# Patient Record
Sex: FEMALE | Race: WHITE | NOT HISPANIC OR LATINO | Employment: STUDENT | URBAN - METROPOLITAN AREA
[De-identification: names, ages, dates, MRNs, and addresses within clinical notes are randomized per-mention and may not be internally consistent; named-entity substitution may affect disease eponyms.]

---
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
## 2022-07-13 ENCOUNTER — OFFICE VISIT (OUTPATIENT)
Dept: URGENT CARE | Facility: URGENT CARE | Age: 22
End: 2022-07-13
Payer: COMMERCIAL

## 2022-07-13 ENCOUNTER — E-VISIT (OUTPATIENT)
Dept: URGENT CARE | Facility: URGENT CARE | Age: 22
End: 2022-07-13
Payer: COMMERCIAL

## 2022-07-13 VITALS
OXYGEN SATURATION: 99 % | BODY MASS INDEX: 23.18 KG/M2 | WEIGHT: 148 LBS | TEMPERATURE: 97.4 F | DIASTOLIC BLOOD PRESSURE: 89 MMHG | SYSTOLIC BLOOD PRESSURE: 124 MMHG | HEART RATE: 73 BPM

## 2022-07-13 DIAGNOSIS — N30.01 ACUTE CYSTITIS WITH HEMATURIA: Primary | ICD-10-CM

## 2022-07-13 DIAGNOSIS — R30.0 DYSURIA: ICD-10-CM

## 2022-07-13 DIAGNOSIS — R30.0 DIFFICULT OR PAINFUL URINATION: Primary | ICD-10-CM

## 2022-07-13 LAB
ALBUMIN UR-MCNC: 100 MG/DL
APPEARANCE UR: ABNORMAL
BACTERIA #/AREA URNS HPF: ABNORMAL /HPF
BILIRUB UR QL STRIP: NEGATIVE
CLUE CELLS: ABNORMAL
COLOR UR AUTO: YELLOW
GLUCOSE UR STRIP-MCNC: NEGATIVE MG/DL
HGB UR QL STRIP: ABNORMAL
KETONES UR STRIP-MCNC: NEGATIVE MG/DL
LEUKOCYTE ESTERASE UR QL STRIP: NEGATIVE
NITRATE UR QL: NEGATIVE
PH UR STRIP: 6 [PH] (ref 5–7)
RBC #/AREA URNS AUTO: >100 /HPF
SP GR UR STRIP: >=1.03 (ref 1–1.03)
SQUAMOUS #/AREA URNS AUTO: ABNORMAL /LPF
TRICHOMONAS, WET PREP: ABNORMAL
UROBILINOGEN UR STRIP-ACNC: 0.2 E.U./DL
WBC #/AREA URNS AUTO: ABNORMAL /HPF
WBC'S/HIGH POWER FIELD, WET PREP: ABNORMAL
YEAST, WET PREP: ABNORMAL

## 2022-07-13 PROCEDURE — 87210 SMEAR WET MOUNT SALINE/INK: CPT | Performed by: NURSE PRACTITIONER

## 2022-07-13 PROCEDURE — 87086 URINE CULTURE/COLONY COUNT: CPT | Performed by: NURSE PRACTITIONER

## 2022-07-13 PROCEDURE — 87186 SC STD MICRODIL/AGAR DIL: CPT | Performed by: NURSE PRACTITIONER

## 2022-07-13 PROCEDURE — 99207 PR NO CHARGE LOS: CPT | Performed by: PHYSICIAN ASSISTANT

## 2022-07-13 PROCEDURE — 81001 URINALYSIS AUTO W/SCOPE: CPT | Performed by: NURSE PRACTITIONER

## 2022-07-13 PROCEDURE — 99214 OFFICE O/P EST MOD 30 MIN: CPT | Performed by: NURSE PRACTITIONER

## 2022-07-13 RX ORDER — NITROFURANTOIN 25; 75 MG/1; MG/1
100 CAPSULE ORAL 2 TIMES DAILY
Qty: 10 CAPSULE | Refills: 0 | Status: SHIPPED | OUTPATIENT
Start: 2022-07-13 | End: 2022-07-18

## 2022-07-13 NOTE — PATIENT INSTRUCTIONS
Dear Vanessa Bush,    We are sorry you are not feeling well. Based on the responses you provided, it is recommended that you be seen in-person in urgent care so we can better evaluate your symptoms. It does sound consistent with a urinary tract infection, but since you've never had one before I would recommend that we confirm it with lab testing.  Please click here to find the nearest urgent care location to you.   You will not be charged for this Visit. Thank you for trusting us with your care.    Sharon Ford PA-C

## 2022-07-13 NOTE — PROGRESS NOTES
Chief Complaint   Patient presents with     UTI     SX URGENCY, PAIN WHILE URINATING, 36 HRS, NO ITCHING, DISCHARGE OR BURNING         ICD-10-CM    1. Acute cystitis with hematuria  N30.01 nitroFURantoin macrocrystal-monohydrate (MACROBID) 100 MG capsule   2. Dysuria  R30.0 UA Macro with Reflex to Micro and Culture - lab collect     Wet prep - lab collect     Wet prep - lab collect     UA Macro with Reflex to Micro and Culture - lab collect     Urine Microscopic     Urine Culture Aerobic Bacterial - lab collect   Antibiotics as prescribed, increase fluid intake, reviewed ways of preventing urinary tract infections.  Advised patient to go to the emergency room if she develops fever, chills, back pain or nausea and vomiting.    32 minutes spent on the date of the encounter doing chart review, history and exam, documentation and further activities per the note        Results for orders placed or performed in visit on 07/13/22 (from the past 24 hour(s))   Wet prep - lab collect    Specimen: Vagina; Swab   Result Value Ref Range    Trichomonas Absent Absent    Yeast Absent Absent    Clue Cells Absent Absent    WBCs/high power field 2+ (A) None   UA Macro with Reflex to Micro and Culture - lab collect    Specimen: Urine, Midstream   Result Value Ref Range    Color Urine Yellow Colorless, Straw, Light Yellow, Yellow    Appearance Urine Slightly Cloudy (A) Clear    Glucose Urine Negative Negative mg/dL    Bilirubin Urine Negative Negative    Ketones Urine Negative Negative mg/dL    Specific Gravity Urine >=1.030 1.003 - 1.035    Blood Urine Large (A) Negative    pH Urine 6.0 5.0 - 7.0    Protein Albumin Urine 100  (A) Negative mg/dL    Urobilinogen Urine 0.2 0.2, 1.0 E.U./dL    Nitrite Urine Negative Negative    Leukocyte Esterase Urine Negative Negative   Urine Microscopic   Result Value Ref Range    Bacteria Urine Few (A) None Seen /HPF    RBC Urine >100 (A) 0-2 /HPF /HPF    WBC Urine 5-10 (A) 0-5 /HPF /HPF    Squamous  Epithelials Urine Few (A) None Seen /LPF    Narrative    Urine Culture not indicated       Subjective     Vanessa Bush is an 22 year old female who presents to clinic today for dysuria for several days.  She also has urinary frequency and urgency.    ROS: 10 point ROS neg other than the symptoms noted above in the HPI.       Objective    /89 (BP Location: Left arm, Patient Position: Sitting, Cuff Size: Adult Regular)   Pulse 73   Temp 97.4  F (36.3  C) (Axillary)   Wt 67.1 kg (148 lb)   SpO2 99%   BMI 23.18 kg/m    Nurses notes and VS have been reviewed.    Physical Exam       GENERAL APPEARANCE: healthy appearing, alert     ABDOMEN:  soft, nontender, no HSM or masses and bowel sounds normal, no CVA tenderness     MS: extremities normal- no gross deformities noted; normal muscle tone.     SKIN: no suspicious lesions or rashes     PSYCH: normal thought process; no significant mood disturbance    Patient Instructions   PREVENTION OF URINARY TRACT INFECTION    AVOID CHEMICAL IRRITTANTS: bath gels,  Perfumed products,  Deoderant pads or tampons, douching    CLOTHING that increases moisture and bacterial growth:  Nylon, lycra, pantihose, pantiliners     AVOID: tight clothing and thongs    ACIDIFY URINE: cranberry tablets instead of cranberry juice (with excess sugar) to acidify urine and decrease bacterial growth.     URINATE after intercourse    FLUIDS: 6-8 glasses water per day       RESHMA Thomas, CNP  [facility] Provider    The use of Dragon/[employer] dictation services may have been used to construct the content in this note; any grammatical or spelling errors are non-intentional. Please contact the author of this note directly if you are in need of any clarification.

## 2022-07-13 NOTE — PATIENT INSTRUCTIONS
PREVENTION OF URINARY TRACT INFECTION    AVOID CHEMICAL IRRITTANTS: bath gels,  Perfumed products,  Deoderant pads or tampons, douching    CLOTHING that increases moisture and bacterial growth:  Nylon, lycra, pantihose, pantiliners     AVOID: tight clothing and thongs    ACIDIFY URINE: cranberry tablets instead of cranberry juice (with excess sugar) to acidify urine and decrease bacterial growth.     URINATE after intercourse    FLUIDS: 6-8 glasses water per day

## 2022-07-16 LAB — BACTERIA UR CULT: ABNORMAL

## 2022-07-21 ENCOUNTER — OFFICE VISIT (OUTPATIENT)
Dept: FAMILY MEDICINE | Facility: CLINIC | Age: 22
End: 2022-07-21
Payer: COMMERCIAL

## 2022-07-21 VITALS
OXYGEN SATURATION: 100 % | TEMPERATURE: 97.5 F | SYSTOLIC BLOOD PRESSURE: 117 MMHG | RESPIRATION RATE: 17 BRPM | WEIGHT: 148.8 LBS | HEART RATE: 72 BPM | DIASTOLIC BLOOD PRESSURE: 74 MMHG | HEIGHT: 67 IN | BODY MASS INDEX: 23.35 KG/M2

## 2022-07-21 DIAGNOSIS — Z11.1 SCREENING EXAMINATION FOR PULMONARY TUBERCULOSIS: ICD-10-CM

## 2022-07-21 DIAGNOSIS — Z11.3 SCREENING FOR STDS (SEXUALLY TRANSMITTED DISEASES): ICD-10-CM

## 2022-07-21 DIAGNOSIS — Z30.41 ENCOUNTER FOR SURVEILLANCE OF CONTRACEPTIVE PILLS: Primary | ICD-10-CM

## 2022-07-21 PROCEDURE — 86481 TB AG RESPONSE T-CELL SUSP: CPT | Performed by: NURSE PRACTITIONER

## 2022-07-21 PROCEDURE — 87591 N.GONORRHOEAE DNA AMP PROB: CPT | Performed by: NURSE PRACTITIONER

## 2022-07-21 PROCEDURE — 36415 COLL VENOUS BLD VENIPUNCTURE: CPT | Performed by: NURSE PRACTITIONER

## 2022-07-21 PROCEDURE — 87491 CHLMYD TRACH DNA AMP PROBE: CPT | Performed by: NURSE PRACTITIONER

## 2022-07-21 PROCEDURE — 99213 OFFICE O/P EST LOW 20 MIN: CPT | Performed by: NURSE PRACTITIONER

## 2022-07-21 RX ORDER — NORGESTIMATE AND ETHINYL ESTRADIOL 0.25-0.035
1 KIT ORAL DAILY
Qty: 84 TABLET | Refills: 4 | Status: SHIPPED | OUTPATIENT
Start: 2022-07-21 | End: 2023-08-14

## 2022-07-21 ASSESSMENT — PAIN SCALES - GENERAL: PAINLEVEL: NO PAIN (0)

## 2022-07-21 NOTE — PROGRESS NOTES
Assessment & Plan     Encounter for surveillance of contraceptive pills  No contraindications. Refilled.  - norgestimate-ethinyl estradiol (ORTHO-CYCLEN) 0.25-35 MG-MCG tablet; Take 1 tablet by mouth daily    Screening for STDs (sexually transmitted diseases)  Asymptomatic.  - NEISSERIA GONORRHOEA PCR; Future  - CHLAMYDIA TRACHOMATIS PCR; Future    Screening examination for pulmonary tuberculosis  No symptoms.  - Quantiferon TB Gold Plus; Future       See Patient Instructions    Return in about 1 year (around 7/21/2023).     Return precautions discussed, including when to seek urgent/emergent care.    Patient verbalizes understanding and agrees with plan of care. Patient stable for discharge.      RESHMA ROSAS [facility]    Rekha Mendez is a 22 year old, presenting for the following health issues:  Refill Request and TB gold (Patient needs tb gold for work )      History of Present Illness       Reason for visit:  Perscription refill and TB skin test    She eats 4 or more servings of fruits and vegetables daily.She consumes 1 sweetened beverage(s) daily.She exercises with enough effort to increase her heart rate 60 or more minutes per day.  She exercises with enough effort to increase her heart rate 6 days per week.   She is taking medications regularly.         The patient is not more than 35 years old. She denies smoking, diabetes, HTN, DVT, headaches with focal neurological symptoms, migraines, known thrombogenic mutations, heart disease, history of stroke, valvular disease, abnormal uterine bleeding, liver disease and personal or family history of breast cancer.    Needs TB testing. No known exposure. No cough, hemoptysis, night sweats or weight loss.    Review of Systems   Constitutional, HEENT, cardiovascular, pulmonary, gi and gu systems are negative, except as otherwise noted.      Objective    /74 (BP Location: Left arm, Patient Position: Sitting,  "Cuff Size: Adult Regular)   Pulse 72   Temp 97.5  F (36.4  C) (Tympanic)   Resp 17   Ht 1.695 m (5' 6.75\")   Wt 67.5 kg (148 lb 12.8 oz)   SpO2 100%   BMI 23.48 kg/m    Body mass index is 23.48 kg/m .  Physical Exam   GENERAL: healthy, alert and no distress  RESP: lungs clear to auscultation - no rales, rhonchi or wheezes  CV: regular rate and rhythm, normal S1 S2, no S3 or S4, no murmur, click or rub, no peripheral edema and peripheral pulses strong  MS: no gross musculoskeletal defects noted, no edema  PSYCH: mentation appears normal, affect normal/bright                    .  ..  "

## 2022-07-22 LAB
C TRACH DNA SPEC QL NAA+PROBE: NEGATIVE
N GONORRHOEA DNA SPEC QL NAA+PROBE: NEGATIVE

## 2022-07-23 LAB
GAMMA INTERFERON BACKGROUND BLD IA-ACNC: 0 IU/ML
M TB IFN-G BLD-IMP: NEGATIVE
M TB IFN-G CD4+ BCKGRND COR BLD-ACNC: 10 IU/ML
MITOGEN IGNF BCKGRD COR BLD-ACNC: 0.01 IU/ML
MITOGEN IGNF BCKGRD COR BLD-ACNC: 0.01 IU/ML
QUANTIFERON MITOGEN: 10 IU/ML
QUANTIFERON NIL TUBE: 0 IU/ML
QUANTIFERON TB1 TUBE: 0.01 IU/ML
QUANTIFERON TB2 TUBE: 0.01

## 2022-12-13 ENCOUNTER — TELEPHONE (OUTPATIENT)
Dept: PEDIATRICS | Facility: CLINIC | Age: 22
End: 2022-12-13

## 2022-12-13 NOTE — TELEPHONE ENCOUNTER
Called Vanessa and she will call her primary clinic to reschedule. SURESH GREENE on 12/13/2022 at 12:39 PM

## 2022-12-13 NOTE — TELEPHONE ENCOUNTER
Please call patient and get on a family medicine schedule. I do not see new patient over 20.Janeth REDDY MD, MD 12/13/2022 12:05 PM

## 2022-12-16 ENCOUNTER — TRANSFERRED RECORDS (OUTPATIENT)
Dept: HEALTH INFORMATION MANAGEMENT | Facility: CLINIC | Age: 22
End: 2022-12-16

## 2022-12-19 ENCOUNTER — OFFICE VISIT (OUTPATIENT)
Dept: FAMILY MEDICINE | Facility: CLINIC | Age: 22
End: 2022-12-19
Payer: COMMERCIAL

## 2022-12-19 VITALS
WEIGHT: 141 LBS | SYSTOLIC BLOOD PRESSURE: 100 MMHG | HEART RATE: 75 BPM | TEMPERATURE: 98.2 F | DIASTOLIC BLOOD PRESSURE: 68 MMHG | OXYGEN SATURATION: 99 % | HEIGHT: 67 IN | RESPIRATION RATE: 14 BRPM | BODY MASS INDEX: 22.13 KG/M2

## 2022-12-19 DIAGNOSIS — Z01.818 PREOP GENERAL PHYSICAL EXAM: Primary | ICD-10-CM

## 2022-12-19 DIAGNOSIS — S83.512D RUPTURE OF ANTERIOR CRUCIATE LIGAMENT OF LEFT KNEE, SUBSEQUENT ENCOUNTER: ICD-10-CM

## 2022-12-19 LAB — HCG UR QL: NEGATIVE

## 2022-12-19 PROCEDURE — 81025 URINE PREGNANCY TEST: CPT | Performed by: PHYSICIAN ASSISTANT

## 2022-12-19 PROCEDURE — 99214 OFFICE O/P EST MOD 30 MIN: CPT | Performed by: PHYSICIAN ASSISTANT

## 2022-12-19 ASSESSMENT — PAIN SCALES - GENERAL: PAINLEVEL: NO PAIN (0)

## 2022-12-19 NOTE — PROGRESS NOTES
[facility]  [address]  [place] [address]  Phone: [phone]  Primary Provider: Savana Finney  Pre-op Performing Provider: ROBIN SHAVER    PREOPERATIVE EVALUATION:  Today's date: 12/19/2022    Vanessa Bush is a 22 year old female who presents for a preoperative evaluation.    Surgical Information:  Surgery/Procedure: ACL, left knee  Surgery Location: [facility] with [facility]  Surgeon: TORO Giraldo  Surgery Date: 12/20/22  Time of Surgery: 11:45 am  Where patient plans to recover: At home with family  Fax number for surgical facility: [phone]    Type of Anesthesia Anticipated: General    Assessment & Plan     The proposed surgical procedure is considered INTERMEDIATE risk.    Problem List Items Addressed This Visit    None  Visit Diagnoses     Preop general physical exam    -  Primary    Rupture of anterior cruciate ligament of left knee, subsequent encounter            Risks and Recommendations:  The patient has the following additional risks and recommendations for perioperative complications:   - No identified additional risk factors other than previously addressed    Medication Instructions:  Patient is to take all scheduled medications on the day of surgery    RECOMMENDATION:  APPROVAL GIVEN to proceed with proposed procedure pending review of diagnostic evaluation. I will contact her if HCG returns positive, otherwise, she may proceed with procedure.    Ordering of each unique test    Need physical copy for patient    Subjective     HPI related to upcoming procedure: left knee arthroscopy for ACL repair. LMP 2 weeks ago.       Preop Questions 12/17/2022   1. Have you ever had a heart attack or stroke? No   2. Have you ever had surgery on your heart or blood vessels, such as a stent placement, a coronary artery bypass, or surgery on an artery in your head, neck, heart, or legs? No   3. Do you have chest pain with activity? No   4. Do you have a  history of  heart failure? No   5. Do you currently have a cold, bronchitis or symptoms of other infection? No   6. Do you have a cough, shortness of breath, or wheezing? No   7. Do you or anyone in your family have previous history of blood clots? No   8. Do you or does anyone in your family have a serious bleeding problem such as prolonged bleeding following surgeries or cuts? No   9. Have you ever had problems with anemia or been told to take iron pills? No   10. Have you had any abnormal blood loss such as black, tarry or bloody stools, or abnormal vaginal bleeding? No   11. Have you ever had a blood transfusion? No   12. Are you willing to have a blood transfusion if it is medically needed before, during, or after your surgery? Yes   13. Have you or any of your relatives ever had problems with anesthesia? No   14. Do you have sleep apnea, excessive snoring or daytime drowsiness? No   15. Do you have any artifical heart valves or other implanted medical devices like a pacemaker, defibrillator, or continuous glucose monitor? No   16. Do you have artificial joints? No   17. Are you allergic to latex? No   18. Is there any chance that you may be pregnant? No     Health Care Directive:  Patient has a Health Care Directive on file      Preoperative Review of :   reviewed - no record of controlled substances prescribed.    Status of Chronic Conditions:  See problem list for active medical problems.  Problems all longstanding and stable, except as noted/documented.  See ROS for pertinent symptoms related to these conditions.      Review of Systems  CONSTITUTIONAL: NEGATIVE for fever, chills, change in weight  INTEGUMENTARY/SKIN: NEGATIVE for worrisome rashes, moles or lesions  EYES: NEGATIVE for vision changes or irritation  ENT/MOUTH: NEGATIVE for ear, mouth and throat problems  RESP: NEGATIVE for significant cough or SOB  CV: NEGATIVE for chest pain, palpitations or peripheral edema  GI: NEGATIVE for nausea,  "abdominal pain, heartburn, or change in bowel habits  : NEGATIVE for frequency, dysuria, or hematuria  MUSCULOSKELETAL: NEGATIVE for significant arthralgias or myalgia  NEURO: NEGATIVE for weakness, dizziness or paresthesias  ENDOCRINE: NEGATIVE for temperature intolerance, skin/hair changes  HEME: NEGATIVE for bleeding problems  PSYCHIATRIC: NEGATIVE for changes in mood or affect    Patient Active Problem List    Diagnosis Date Noted     Cutaneous skin tags 06/10/2015     Priority: Medium      No past medical history on file.  No past surgical history on file.  Current Outpatient Medications   Medication Sig Dispense Refill     norgestimate-ethinyl estradiol (ORTHO-CYCLEN) 0.25-35 MG-MCG tablet Take 1 tablet by mouth daily 84 tablet 4       No Known Allergies     Social History     Tobacco Use     Smoking status: Never     Smokeless tobacco: Never   Substance Use Topics     Alcohol use: No     Family History   Problem Relation Age of Onset     Breast Cancer Mother      History   Drug Use No         Objective     /68   Pulse 75   Temp 98.2  F (36.8  C) (Tympanic)   Resp 14   Ht 1.708 m (5' 7.24\")   Wt 64 kg (141 lb)   LMP 12/05/2022 (Approximate)   SpO2 99%   Breastfeeding No   BMI 21.92 kg/m      Physical Exam    GENERAL APPEARANCE: healthy, alert and no distress     HENT: nose and mouth without ulcers or lesions     NECK: no adenopathy, no asymmetry, masses, or scars and thyroid normal to palpation     RESP: lungs clear to auscultation - no rales, rhonchi or wheezes     CV: regular rates and rhythm, normal S1 S2, no S3 or S4 and no murmur, click or rub     ABDOMEN:  soft, nontender, no HSM or masses and bowel sounds normal     MS: extremities normal- no gross deformities noted, no evidence of inflammation in joints, FROM in all extremities.     SKIN: no suspicious lesions or rashes     NEURO: Normal strength and tone, sensory exam grossly normal, mentation intact and speech normal     PSYCH: " mentation appears normal. and affect normal/bright     LYMPHATICS: No cervical adenopathy    Diagnostics:  Labs pending at this time.  Results will be reviewed when available.   No EKG required, no history of coronary heart disease, significant arrhythmia, peripheral arterial disease or other structural heart disease.    Revised Cardiac Risk Index (RCRI):  The patient has the following serious cardiovascular risks for perioperative complications:   - No serious cardiac risks = 0 points     RCRI Interpretation: 0 points: Class I (very low risk - 0.4% complication rate)    Signed Electronically by: RENETTA Kiran  Copy of this evaluation report is provided to requesting physician.

## 2022-12-19 NOTE — PATIENT INSTRUCTIONS
Lewis Mendez,    Thank you for allowing [facility] to manage your care.    I ordered some lab work, please go to the laboratory to get your studies.    If you have any questions or concerns, please feel free to call us at [phone]    Sincerely,    Fady Andres PA-C    Did you know?      You can schedule a video visit for follow-up appointments as well as future appointments for certain conditions.  Please see the below link.     [email or web address]    If you have not already done so,  I encourage you to sign up for [employer] ([email or web address]).  This will allow you to review your results, securely communicate with a provider, and schedule virtual visits as well.    For informational purposes only. Not to replace the advice of your health care provider. Copyright   2003, 2019 [facility]. All rights reserved. Clinically reviewed by Irish Dugan MD. tocario [id number] - REV 12/22.  Preparing for Your Surgery  Getting started  A nurse will call you to review your health history and instructions. They will give you an arrival time based on your scheduled surgery time. Please be ready to share:  Your doctor's clinic name and phone number  Your medical, surgical, and anesthesia history  A list of allergies and sensitivities  A list of medicines, including herbal treatments and over-the-counter drugs  Whether the patient has a legal guardian (ask how to send us the papers in advance)  Please tell us if you're pregnant--or if there's any chance you might be pregnant. Some surgeries may injure a fetus (unborn baby), so they require a pregnancy test. Surgeries that are safe for a fetus don't always need a test, and you can choose whether to have one.   If you have a child who's having surgery, please ask for a copy of Preparing for Your Child's Surgery.    Preparing for surgery  Within 10 to 30 days of surgery: Have a pre-op exam (sometimes called  an H&P, or History and Physical). This can be done at a clinic or pre-operative center.  If you're having a , you may not need this exam. Talk to your care team.  At your pre-op exam, talk to your care team about all medicines you take. If you need to stop any medicines before surgery, ask when to start taking them again.  We do this for your safety. Many medicines can make you bleed too much during surgery. Some change how well surgery (anesthesia) drugs work.  Call your insurance company to let them know you're having surgery. (If you don't have insurance, call [phone].)  Call your clinic if there's any change in your health. This includes signs of a cold or flu (sore throat, runny nose, cough, rash, fever). It also includes a scrape or scratch near the surgery site.  If you have questions on the day of surgery, call your hospital or surgery center.  Eating and drinking guidelines  For your safety: Unless your surgeon tells you otherwise, follow the guidelines below.  Eat and drink as usual until 8 hours before you arrive for surgery. After that, no food or milk.  Drink clear liquids until 2 hours before you arrive. These are liquids you can see through, like water, Gatorade, and Propel Water. They also include plain black coffee and tea (no cream or milk), candy, and breath mints. You can spit out gum when you arrive.  If you drink alcohol: Stop drinking it the night before surgery.  If your care team tells you to take medicine on the morning of surgery, it's okay to take it with a sip of water.  Preventing infection  Shower or bathe the night before and morning of your surgery. Follow the instructions your clinic gave you. (If no instructions, use regular soap.)  Don't shave or clip hair near your surgery site. We'll remove the hair if needed.  Don't smoke or vape the morning of surgery. You may chew nicotine gum up to 2 hours before surgery. A nicotine patch is okay.  Note: Some surgeries require  you to completely quit smoking and nicotine. Check with your surgeon.  Your care team will make every effort to keep you safe from infection. We will:  Clean our hands often with soap and water (or an alcohol-based hand rub).  Clean the skin at your surgery site with a special soap that kills germs.  Give you a special gown to keep you warm. (Cold raises the risk of infection.)  Wear special hair covers, masks, gowns and gloves during surgery.  Give antibiotic medicine, if prescribed. Not all surgeries need antibiotics.  What to bring on the day of surgery  Photo ID and insurance card  Copy of your health care directive, if you have one  Glasses and hearing aids (bring cases)  You can't wear contacts during surgery  Inhaler and eye drops, if you use them (tell us about these when you arrive)  CPAP machine or breathing device, if you use them  A few personal items, if spending the night  If you have . . .  A pacemaker, ICD (cardiac defibrillator) or other implant: Bring the ID card.  An implanted stimulator: Bring the remote control.  A legal guardian: Bring a copy of the certified (court-stamped) guardianship papers.  Please remove any jewelry, including body piercings. Leave jewelry and other valuables at home.  If you're going home the day of surgery  You must have a responsible adult drive you home. They should stay with you overnight as well.  If you don't have someone to stay with you, and you aren't safe to go home alone, we may keep you overnight. Insurance often won't pay for this.  After surgery  If it's hard to control your pain or you need more pain medicine, please call your surgeon's office.  Questions?   If you have any questions for your care team, list them here: _________________________________________________________________________________________________________________________________________________________________________ ____________________________________  ____________________________________ ____________________________________

## 2023-01-03 ENCOUNTER — TRANSFERRED RECORDS (OUTPATIENT)
Dept: HEALTH INFORMATION MANAGEMENT | Facility: CLINIC | Age: 23
End: 2023-01-03

## 2023-01-31 ENCOUNTER — TRANSFERRED RECORDS (OUTPATIENT)
Dept: HEALTH INFORMATION MANAGEMENT | Facility: CLINIC | Age: 23
End: 2023-01-31

## 2023-03-09 ENCOUNTER — TRANSFERRED RECORDS (OUTPATIENT)
Dept: HEALTH INFORMATION MANAGEMENT | Facility: CLINIC | Age: 23
End: 2023-03-09

## 2023-04-23 ENCOUNTER — HEALTH MAINTENANCE LETTER (OUTPATIENT)
Age: 23
End: 2023-04-23

## 2023-05-31 ENCOUNTER — OFFICE VISIT (OUTPATIENT)
Dept: URGENT CARE | Facility: URGENT CARE | Age: 23
End: 2023-05-31
Payer: COMMERCIAL

## 2023-05-31 VITALS
BODY MASS INDEX: 22.71 KG/M2 | OXYGEN SATURATION: 99 % | RESPIRATION RATE: 16 BRPM | WEIGHT: 144.7 LBS | DIASTOLIC BLOOD PRESSURE: 87 MMHG | SYSTOLIC BLOOD PRESSURE: 134 MMHG | HEART RATE: 83 BPM | TEMPERATURE: 98.1 F | HEIGHT: 67 IN

## 2023-05-31 DIAGNOSIS — R07.0 THROAT PAIN: Primary | ICD-10-CM

## 2023-05-31 LAB
DEPRECATED S PYO AG THROAT QL EIA: NEGATIVE
GROUP A STREP BY PCR: NOT DETECTED

## 2023-05-31 PROCEDURE — 99213 OFFICE O/P EST LOW 20 MIN: CPT | Performed by: PHYSICIAN ASSISTANT

## 2023-05-31 PROCEDURE — 87651 STREP A DNA AMP PROBE: CPT | Performed by: PHYSICIAN ASSISTANT

## 2023-05-31 ASSESSMENT — PAIN SCALES - GENERAL: PAINLEVEL: NO PAIN (1)

## 2023-05-31 NOTE — PROGRESS NOTES
"Chief Complaint   Patient presents with     Pharyngitis     Throat pain beginning yesterday morning.       ASSESSMENT/PLAN:  Vanessa was seen today for pharyngitis.    Diagnoses and all orders for this visit:    Throat pain  -     Streptococcus A Rapid Screen w/Reflex to PCR - Clinic Collect  -     Group A Streptococcus PCR Throat Swab    Appears well overall, reassuring exam and vitals    Strep negative.  Likely viral    Jose Kirkpatrick PA-C      SUBJECTIVE:  Vanessa is a 23 year old female who presents to urgent care with 1 day of sore throat.  Feels otherwise well.    ROS: Pertinent ROS neg other than the symptoms noted above in the HPI.     OBJECTIVE:  /87 (BP Location: Left arm, Patient Position: Sitting, Cuff Size: Adult Regular)   Pulse 83   Temp 98.1  F (36.7  C) (Tympanic)   Resp 16   Ht 1.702 m (5' 7\")   Wt 65.6 kg (144 lb 11.2 oz)   SpO2 99%   BMI 22.66 kg/m     GENERAL: healthy, alert and no distress  EYES: Eyes grossly normal to inspection, PERRL and conjunctivae and sclerae normal  HENT: ear canals and TM's normal, nose and mouth without ulcers or lesions, left tonsil has 1 spot of exudate, minimal erythema  NECK: no adenopathy, nontender  RESP: lungs clear to auscultation - no rales, rhonchi or wheezes  CV: regular rate and rhythm, normal S1 S2, no S3 or S4, no murmur, click or rub    DIAGNOSTICS    Results for orders placed or performed in visit on 05/31/23   Streptococcus A Rapid Screen w/Reflex to PCR - Clinic Collect     Status: Normal    Specimen: Throat; Swab   Result Value Ref Range    Group A Strep antigen Negative Negative        Current Outpatient Medications   Medication     norgestimate-ethinyl estradiol (ORTHO-CYCLEN) 0.25-35 MG-MCG tablet     No current facility-administered medications for this visit.      Patient Active Problem List   Diagnosis     Cutaneous skin tags      No past medical history on file.  No past surgical history on file.  Family History   Problem " Relation Age of Onset     Breast Cancer Mother      Social History     Tobacco Use     Smoking status: Never     Passive exposure: Never     Smokeless tobacco: Never   Vaping Use     Vaping status: Never Used   Substance Use Topics     Alcohol use: No              The plan of care was discussed with the patient. They understand and agree with the course of treatment prescribed. A printed summary was given including instructions and medications.  The use of Dragon/[employer] dictation services may have been used to construct the content in this note; any grammatical or spelling errors are non-intentional. Please contact the author of this note directly if you are in need of any clarification.

## 2023-06-22 ENCOUNTER — TRANSFERRED RECORDS (OUTPATIENT)
Dept: HEALTH INFORMATION MANAGEMENT | Facility: CLINIC | Age: 23
End: 2023-06-22
Payer: COMMERCIAL

## 2023-08-14 ENCOUNTER — VIRTUAL VISIT (OUTPATIENT)
Dept: FAMILY MEDICINE | Facility: CLINIC | Age: 23
End: 2023-08-14
Payer: COMMERCIAL

## 2023-08-14 DIAGNOSIS — L70.0 ACNE VULGARIS: ICD-10-CM

## 2023-08-14 DIAGNOSIS — Z30.41 ENCOUNTER FOR SURVEILLANCE OF CONTRACEPTIVE PILLS: Primary | ICD-10-CM

## 2023-08-14 PROCEDURE — 99213 OFFICE O/P EST LOW 20 MIN: CPT | Mod: VID | Performed by: NURSE PRACTITIONER

## 2023-08-14 RX ORDER — TRETINOIN 0.25 MG/G
CREAM TOPICAL AT BEDTIME
Qty: 45 G | Refills: 3 | Status: SHIPPED | OUTPATIENT
Start: 2023-08-14 | End: 2024-06-06

## 2023-08-14 RX ORDER — NORGESTIMATE AND ETHINYL ESTRADIOL 0.25-0.035
1 KIT ORAL DAILY
Qty: 84 TABLET | Refills: 4 | Status: SHIPPED | OUTPATIENT
Start: 2023-08-14 | End: 2024-06-06

## 2023-08-14 NOTE — PROGRESS NOTES
Vanessa is a 23 year old who is being evaluated via a billable video visit.      How would you like to obtain your AVS? MyChart  If the video visit is dropped, the invitation should be resent by: Text to cell phone: [phone]  Will anyone else be joining your video visit? No        Assessment & Plan     Encounter for surveillance of contraceptive pills  Taking consistently, tolerating well.   Declines STD testing today.   - norgestimate-ethinyl estradiol (ORTHO-CYCLEN) 0.25-35 MG-MCG tablet; Take 1 tablet by mouth daily    Acne vulgaris  Skin care reviewed.   Has used tretinoin in past with good effect.   Rx sent today, discussed administration and monitoring.     - tretinoin (RETIN-A) 0.025 % external cream; Apply topically At Bedtime    RESHMA Briones [facility]    Rekha Mendez is a 23 year old, presenting for the following health issues:  Contraception        8/14/2023    10:03 AM   Additional Questions   Roomed by Alejandrina DE LA CRUZ     Medication Followup of ORTHO-CYCLEN  Taking Medication as prescribed: yes  Side Effects:  None  Medication Helping Symptoms:  yes    Patient requests NANY refill. Taking consistently, no frequent missed doses. Denies spotting, dysmenorrhea, or other concerns.  Last dose taken yesterday, if takes today no break in dosing.   +sexual activity. Declines STI screening.     2. Acne  Reports acne to face, intermittent, typically at same time of menstrual cycle monthly.  Using mild cleanser, toner, moisturizer. No medicated agents at present.   Has used sister's tretinoin in past with good effect, tolerates well with no skin burning/dryness.       Review of Systems   Constitutional, HEENT, cardiovascular, pulmonary, GI, , musculoskeletal, neuro, skin, endocrine and psych systems are negative, except as otherwise noted.      Objective           Vitals:  No vitals were obtained today due to virtual visit.    Physical Exam   GENERAL:  Healthy, alert and no distress  EYES: Eyes grossly normal to inspection.  No discharge or erythema, or obvious scleral/conjunctival abnormalities.  RESP: No audible wheeze, cough, or visible cyanosis.  No visible retractions or increased work of breathing.    SKIN: few isolated papular lesions to face, chin.  Non-pustular, non-inflammatory acne.   NEURO: Cranial nerves grossly intact.  Mentation and speech appropriate for age.  PSYCH: Mentation appears normal, affect normal/bright, judgement and insight intact, normal speech and appearance well-groomed.              Video-Visit Details    Type of service:  Video Visit     Originating Location (pt. Location): Home    Distant Location (provider location):  On-site  Platform used for Video Visit: [employer]

## 2023-08-21 ENCOUNTER — TRANSFERRED RECORDS (OUTPATIENT)
Dept: HEALTH INFORMATION MANAGEMENT | Facility: CLINIC | Age: 23
End: 2023-08-21
Payer: COMMERCIAL

## 2023-09-28 ENCOUNTER — TRANSFERRED RECORDS (OUTPATIENT)
Dept: HEALTH INFORMATION MANAGEMENT | Facility: CLINIC | Age: 23
End: 2023-09-28
Payer: COMMERCIAL

## 2023-12-18 ENCOUNTER — TRANSFERRED RECORDS (OUTPATIENT)
Dept: HEALTH INFORMATION MANAGEMENT | Facility: CLINIC | Age: 23
End: 2023-12-18
Payer: COMMERCIAL

## 2024-06-02 SDOH — HEALTH STABILITY: PHYSICAL HEALTH: ON AVERAGE, HOW MANY MINUTES DO YOU ENGAGE IN EXERCISE AT THIS LEVEL?: 60 MIN

## 2024-06-02 SDOH — HEALTH STABILITY: PHYSICAL HEALTH: ON AVERAGE, HOW MANY DAYS PER WEEK DO YOU ENGAGE IN MODERATE TO STRENUOUS EXERCISE (LIKE A BRISK WALK)?: 6 DAYS

## 2024-06-02 ASSESSMENT — SOCIAL DETERMINANTS OF HEALTH (SDOH): HOW OFTEN DO YOU GET TOGETHER WITH FRIENDS OR RELATIVES?: MORE THAN THREE TIMES A WEEK

## 2024-06-06 ENCOUNTER — OFFICE VISIT (OUTPATIENT)
Dept: FAMILY MEDICINE | Facility: CLINIC | Age: 24
End: 2024-06-06
Payer: COMMERCIAL

## 2024-06-06 VITALS
BODY MASS INDEX: 23.45 KG/M2 | OXYGEN SATURATION: 100 % | DIASTOLIC BLOOD PRESSURE: 85 MMHG | SYSTOLIC BLOOD PRESSURE: 126 MMHG | WEIGHT: 149.4 LBS | HEIGHT: 67 IN | HEART RATE: 79 BPM | RESPIRATION RATE: 16 BRPM | TEMPERATURE: 97.7 F

## 2024-06-06 DIAGNOSIS — Z12.4 CERVICAL CANCER SCREENING: ICD-10-CM

## 2024-06-06 DIAGNOSIS — Z00.00 ROUTINE GENERAL MEDICAL EXAMINATION AT A HEALTH CARE FACILITY: Primary | ICD-10-CM

## 2024-06-06 DIAGNOSIS — Z30.41 ENCOUNTER FOR SURVEILLANCE OF CONTRACEPTIVE PILLS: ICD-10-CM

## 2024-06-06 PROBLEM — S83.519A COMPLETE TEAR, KNEE, ANTERIOR CRUCIATE LIGAMENT: Status: RESOLVED | Noted: 2022-12-11 | Resolved: 2024-06-06

## 2024-06-06 PROCEDURE — 90471 IMMUNIZATION ADMIN: CPT

## 2024-06-06 PROCEDURE — 87491 CHLMYD TRACH DNA AMP PROBE: CPT

## 2024-06-06 PROCEDURE — 99395 PREV VISIT EST AGE 18-39: CPT | Mod: 25

## 2024-06-06 PROCEDURE — G0145 SCR C/V CYTO,THINLAYER,RESCR: HCPCS

## 2024-06-06 PROCEDURE — 90715 TDAP VACCINE 7 YRS/> IM: CPT

## 2024-06-06 PROCEDURE — 87591 N.GONORRHOEAE DNA AMP PROB: CPT

## 2024-06-06 RX ORDER — NORGESTIMATE AND ETHINYL ESTRADIOL 0.25-0.035
1 KIT ORAL DAILY
Qty: 84 TABLET | Refills: 4 | Status: SHIPPED | OUTPATIENT
Start: 2024-06-06

## 2024-06-06 ASSESSMENT — PAIN SCALES - GENERAL: PAINLEVEL: NO PAIN (0)

## 2024-06-06 NOTE — PATIENT INSTRUCTIONS
"Preventive Care Advice   This is general advice we often give to help people stay healthy. Your care team may have specific advice just for you. Please talk to your care team about your own preventive care needs.  Lifestyle  Exercise at least 150 minutes each week (30 minutes a day, 5 days a week).  Do muscle strengthening activities 2 days a week. These help control your weight and prevent disease.  No smoking.  Wear sunscreen to prevent skin cancer.  Have your home tested for radon every 2 to 5 years. Radon is a colorless, odorless gas that can harm your lungs. To learn more, go to www.health.[facility].mn. and search for \"Radon in Homes.\"  Keep guns unloaded and locked up in a safe place like a safe or gun vault, or, use a gun lock and hide the keys. Always lock away bullets separately. To learn more, visit [employer].mn.gov and search for \"safe gun storage.\"  Nutrition  Eat 5 or more servings of fruits and vegetables each day.  Try wheat bread, brown rice and whole grain pasta (instead of white bread, rice, and pasta).  Get enough calcium and vitamin D. Check the label on foods and aim for 100% of the RDA (recommended daily allowance).  Regular exams  Have a dental exam and cleaning every 6 months.  See your health care team every year to talk about:  Any changes in your health.  Any medicines your care team has prescribed.  Preventive care, family planning, and ways to prevent chronic diseases.  Shots (vaccines)   HPV shots (up to age 26), if you've never had them before.  Hepatitis B shots (up to age 59), if you've never had them before.  COVID-19 shot: Get this shot when it's due.  Flu shot: Get a flu shot every year.  Tetanus shot: Get a tetanus shot every 10 years.  Pneumococcal, hepatitis A, and RSV shots: Ask your care team if you need these based on your risk.  Shingles shot (for age 50 and up).  General health tests  Diabetes screening:  Starting at age 35, Get screened for diabetes at least every 3 years.  If " you are younger than age 35, ask your care team if you should be screened for diabetes.  Cholesterol test: At age 39, start having a cholesterol test every 5 years, or more often if advised.  Bone density scan (DEXA): At age 50, ask your care team if you should have this scan for osteoporosis (brittle bones).  Hepatitis C: Get tested at least once in your life.  Abdominal aortic aneurysm screening: Talk to your doctor about having this screening if you:  Have ever smoked; and  Are biologically male; and  Are between the ages of 65 and 75.  STIs (sexually transmitted infections)  Before age 24: Ask your care team if you should be screened for STIs.  After age 24: Get screened for STIs if you're at risk. You are at risk for STIs (including HIV) if:  You are sexually active with more than one person.  You don't use condoms every time.  You or a partner was diagnosed with a sexually transmitted infection.  If you are at risk for HIV, ask about PrEP medicine to prevent HIV.  Get tested for HIV at least once in your life, whether you are at risk for HIV or not.  Cancer screening tests  Cervical cancer screening: If you have a cervix, begin getting regular cervical cancer screening tests at age 21. Most people who have regular screenings with normal results can stop after age 65. Talk about this with your provider.  Breast cancer scan (mammogram): If you've ever had breasts, begin having regular mammograms starting at age 40. This is a scan to check for breast cancer.  Colon cancer screening: It is important to start screening for colon cancer at age 45.  Have a colonoscopy test every 10 years (or more often if you're at risk) Or, ask your provider about stool tests like a FIT test every year or Cologuard test every 3 years.  To learn more about your testing options, visit: www.[employer]/453445.pdf.  For help making a decision, visit: victor hugo/sn18607.  Prostate cancer screening test: If you have a prostate and are age 55  to 69, ask your provider if you would benefit from a yearly prostate cancer screening test.  Lung cancer screening: If you are a current or former smoker age 50 to 80, ask your care team if ongoing lung cancer screenings are right for you.  For informational purposes only. Not to replace the advice of your health care provider. Copyright   2023 [employer]. All rights reserved. Clinically reviewed by the [facility]. [employer] 923485 - REV 04/24.

## 2024-06-06 NOTE — PROGRESS NOTES
Preventive Care Visit  [facility] VENKAT RESHMA Wray CNP, Family Medicine  Jun 6, 2024      Assessment & Plan     Routine general medical examination at a health care facility  - Health maintenance and lifestyle reviewed. Updated medical and family history.  - Follow up in one year or sooner as needed.   - TDAP 10-64Y (ADACEL,BOOSTRIX)  - PRIMARY CARE FOLLOW-UP SCHEDULING  - Chlamydia & Gonorrhea by PCR, G[facility]/Range - Clinic Collect    Cervical cancer screening  - Discussed screening recommendations.  - Pap Screen Only - Recommended Age 21 - 24 Years    Encounter for surveillance of contraceptive pills  - Tolerating well. No side effects or contraindications. Refilled.   - norgestimate-ethinyl estradiol (ORTHO-CYCLEN) 0.25-35 MG-MCG tablet  Dispense: 84 tablet; Refill: 4    Counseling  Appropriate preventive services were discussed with this patient, including applicable screening as appropriate for fall prevention, nutrition, physical activity, Tobacco-use cessation, weight loss and cognition.  Checklist reviewing preventive services available has been given to the patient.  Reviewed patient's diet, addressing concerns and/or questions.     Return for physical in one year or sooner as needed. Cleared for sports participation.     Rekha Mendez is a 24 year old, presenting for the following:  Physical and Contraception        6/6/2024     4:05 PM   Additional Questions   Roomed by Jane         6/6/2024     4:05 PM   Patient Reported Additional Medications   Patient reports taking the following new medications none      Health Care Directive  Patient has a Health Care Directive on file  Advance care planning document is on file and is current.        6/2/2024   General Health   How would you rate your overall physical health? Excellent   Feel stress (tense, anxious, or unable to sleep) Not at all         6/2/2024   Nutrition   Three or more servings of calcium each day? Yes    Diet: Regular (no restrictions)   How many servings of fruit and vegetables per day? 4 or more   How many sweetened beverages each day? 0-1         6/2/2024   Exercise   Days per week of moderate/strenous exercise 6 days   Average minutes spent exercising at this level 60 min         6/2/2024   Social Factors   Frequency of gathering with friends or relatives More than three times a week   Worry food won't last until get money to buy more No   Food not last or not have enough money for food? No   Do you have housing?  Yes   Are you worried about losing your housing? No   Lack of transportation? No   Unable to get utilities (heat,electricity)? No         6/2/2024   Dental   Dentist two times every year? Yes     Today's PHQ-2 Score:       6/6/2024     4:04 PM   PHQ-2 ( 1999 [employer])   Q1: Little interest or pleasure in doing things 0   Q2: Feeling down, depressed or hopeless 0   PHQ-2 Score 0   Q1: Little interest or pleasure in doing things Not at all   Q2: Feeling down, depressed or hopeless Not at all   PHQ-2 Score 0         6/2/2024   Substance Use   Alcohol more than 3/day or more than 7/wk No   Do you use any other substances recreationally? No     Social History     Tobacco Use    Smoking status: Never     Passive exposure: Never    Smokeless tobacco: Never   Vaping Use    Vaping status: Never Used   Substance Use Topics    Alcohol use: No    Drug use: No         6/2/2024   STI Screening   New sexual partner(s) since last STI/HIV test? No     History of abnormal Pap smear: No - under age 21, PAP not appropriate for age         6/2/2024   Contraception/Family Planning   Questions about contraception or family planning No     Reviewed and updated as needed this visit by Provider   Tobacco   Meds  Problems    Fam Hx  Soc Hx Sexual Activity        Review of Systems  Constitutional, HEENT, cardiovascular, pulmonary, gi and gu systems are negative, except as otherwise noted.     Objective      Exam  /85  "(BP Location: Left arm, Patient Position: Sitting, Cuff Size: Adult Regular)   Pulse 79   Temp 97.7  F (36.5  C) (Oral)   Resp 16   Ht 1.701 m (5' 6.97\")   Wt 67.8 kg (149 lb 6.4 oz)   SpO2 100%   BMI 23.42 kg/m     Estimated body mass index is 23.42 kg/m  as calculated from the following:    Height as of this encounter: 1.701 m (5' 6.97\").    Weight as of this encounter: 67.8 kg (149 lb 6.4 oz).    Physical Exam  GENERAL: alert and no distress  EYES: Eyes grossly normal to inspection, PERRL and conjunctivae and sclerae normal  HENT: ear canals and TM's normal, nose and mouth without ulcers or lesions  NECK: no adenopathy, no asymmetry, masses, or scars  RESP: lungs clear to auscultation - no rales, rhonchi or wheezes  CV: regular rate and rhythm, normal S1 S2, no S3 or S4, no murmur, click or rub, no peripheral edema  ABDOMEN: soft, nontender, no hepatosplenomegaly, no masses and bowel sounds normal  MS: no gross musculoskeletal defects noted, no edema  SKIN: no suspicious lesions or rashes  NEURO: Normal strength and tone, mentation intact and speech normal  PSYCH: mentation appears normal, affect normal/bright    Signed Electronically by: RESHMA Cheema CNP    "

## 2024-06-07 LAB
C TRACH DNA SPEC QL PROBE+SIG AMP: NEGATIVE
N GONORRHOEA DNA SPEC QL NAA+PROBE: NEGATIVE

## 2024-06-11 LAB
BKR LAB AP GYN ADEQUACY: NORMAL
BKR LAB AP GYN INTERPRETATION: NORMAL
BKR LAB AP HPV REFLEX: NO
BKR LAB AP PREVIOUS ABNORMAL: NORMAL
PATH REPORT.COMMENTS IMP SPEC: NORMAL
PATH REPORT.COMMENTS IMP SPEC: NORMAL
PATH REPORT.RELEVANT HX SPEC: NORMAL

## 2024-08-04 ENCOUNTER — OFFICE VISIT (OUTPATIENT)
Dept: URGENT CARE | Facility: URGENT CARE | Age: 24
End: 2024-08-04
Payer: COMMERCIAL

## 2024-08-04 VITALS
WEIGHT: 148.2 LBS | OXYGEN SATURATION: 100 % | RESPIRATION RATE: 18 BRPM | BODY MASS INDEX: 23.23 KG/M2 | HEART RATE: 83 BPM | DIASTOLIC BLOOD PRESSURE: 86 MMHG | TEMPERATURE: 97.9 F | SYSTOLIC BLOOD PRESSURE: 123 MMHG

## 2024-08-04 DIAGNOSIS — L03.90 CELLULITIS OF SKIN: Primary | ICD-10-CM

## 2024-08-04 PROCEDURE — 99213 OFFICE O/P EST LOW 20 MIN: CPT | Performed by: FAMILY MEDICINE

## 2024-08-04 RX ORDER — CEPHALEXIN 500 MG/1
500 CAPSULE ORAL 3 TIMES DAILY
Qty: 30 CAPSULE | Refills: 0 | Status: SHIPPED | OUTPATIENT
Start: 2024-08-04 | End: 2024-08-14

## 2024-08-04 NOTE — PROGRESS NOTES
Assessment & Plan     (L03.90) Cellulitis of skin  (primary encounter diagnosis)  Comment:   Plan: cephALEXin (KEFLEX) 500 MG capsule          Started has a small blister at the back of her left heel, 3 days ago.  Now the whole area has been red and warm to touch.    Patient has no fever no chills.  She does have a small blister which been opened and leaked clear fluid.  Advised with supportive care, keep elevated, started cephalexin take 3 times a day for the next 10 days.       Judith Campuzano MD  [facility]     Vanessa is a 24 year old female who presents to clinic today for the following health issues:  Chief Complaint   Patient presents with    Blister     Blister to left heel started on Thursday. Dirt and lake water in it, concerned about possible infection. Redness and warm to the touch, itchy.      Patient reports she had a small blister after she was wearing a cowboy boots.  She has more redness, more swollen.  No fever no chills      Review of Systems  Constitutional, HEENT, cardiovascular, pulmonary, gi and gu systems are negative, except as otherwise noted.      Objective    /86 (BP Location: Left arm, Cuff Size: Adult Regular)   Pulse 83   Temp 97.9  F (36.6  C) (Tympanic)   Resp 18   Wt 67.2 kg (148 lb 3.2 oz)   SpO2 100%   BMI 23.23 kg/m    Physical Exam   GENERAL: alert and no distress  SKIN: left heel, achilles area:  Small blister, been opening the leak.  She does have an area of redness, warmth to touch.  The area is about 5 cm x 8 cm in diameter.  No induration and no abscess formation.  PSYCH: mentation appears normal, affect normal/bright

## 2025-02-06 ENCOUNTER — TELEPHONE (OUTPATIENT)
Dept: FAMILY MEDICINE | Facility: OTHER | Age: 25
End: 2025-02-06
Payer: COMMERCIAL

## 2025-02-06 NOTE — TELEPHONE ENCOUNTER
Spoke to Laisha today. Informed her that she is not due for her physical until 6/6/25. She states that [place] is requiring that she have physical signed off. I advised her to contact insurance to see if this would be covered and let her know that we could change visit to a office visit to establish care for new provider. She will get back to us if appointment needs to be changed.

## 2025-02-18 SDOH — HEALTH STABILITY: PHYSICAL HEALTH: ON AVERAGE, HOW MANY DAYS PER WEEK DO YOU ENGAGE IN MODERATE TO STRENUOUS EXERCISE (LIKE A BRISK WALK)?: 6 DAYS

## 2025-02-18 SDOH — HEALTH STABILITY: PHYSICAL HEALTH: ON AVERAGE, HOW MANY MINUTES DO YOU ENGAGE IN EXERCISE AT THIS LEVEL?: 120 MIN

## 2025-02-18 ASSESSMENT — SOCIAL DETERMINANTS OF HEALTH (SDOH): HOW OFTEN DO YOU GET TOGETHER WITH FRIENDS OR RELATIVES?: MORE THAN THREE TIMES A WEEK

## 2025-02-19 ENCOUNTER — OFFICE VISIT (OUTPATIENT)
Dept: FAMILY MEDICINE | Facility: OTHER | Age: 25
End: 2025-02-19
Payer: COMMERCIAL

## 2025-02-19 VITALS
HEART RATE: 69 BPM | BODY MASS INDEX: 21.77 KG/M2 | WEIGHT: 147 LBS | OXYGEN SATURATION: 98 % | SYSTOLIC BLOOD PRESSURE: 107 MMHG | RESPIRATION RATE: 19 BRPM | HEIGHT: 69 IN | TEMPERATURE: 97.7 F | DIASTOLIC BLOOD PRESSURE: 73 MMHG

## 2025-02-19 DIAGNOSIS — Z30.41 ENCOUNTER FOR SURVEILLANCE OF CONTRACEPTIVE PILLS: Primary | ICD-10-CM

## 2025-02-19 DIAGNOSIS — Z11.9 SCREENING EXAMINATION FOR INFECTIOUS DISEASE: ICD-10-CM

## 2025-02-19 DIAGNOSIS — Z11.4 SCREENING FOR HIV (HUMAN IMMUNODEFICIENCY VIRUS): ICD-10-CM

## 2025-02-19 DIAGNOSIS — Z11.1 SCREENING EXAMINATION FOR PULMONARY TUBERCULOSIS: ICD-10-CM

## 2025-02-19 DIAGNOSIS — Z11.59 NEED FOR HEPATITIS C SCREENING TEST: ICD-10-CM

## 2025-02-19 LAB
HBV SURFACE AB SERPL IA-ACNC: 10.9 M[IU]/ML
HBV SURFACE AB SERPL IA-ACNC: NORMAL M[IU]/ML
HCV AB SERPL QL IA: NONREACTIVE
HIV 1+2 AB+HIV1 P24 AG SERPL QL IA: NONREACTIVE

## 2025-02-19 PROCEDURE — 86803 HEPATITIS C AB TEST: CPT | Performed by: FAMILY MEDICINE

## 2025-02-19 PROCEDURE — 99213 OFFICE O/P EST LOW 20 MIN: CPT | Performed by: FAMILY MEDICINE

## 2025-02-19 PROCEDURE — 86706 HEP B SURFACE ANTIBODY: CPT | Performed by: FAMILY MEDICINE

## 2025-02-19 PROCEDURE — G2211 COMPLEX E/M VISIT ADD ON: HCPCS | Performed by: FAMILY MEDICINE

## 2025-02-19 PROCEDURE — 36415 COLL VENOUS BLD VENIPUNCTURE: CPT | Performed by: FAMILY MEDICINE

## 2025-02-19 PROCEDURE — 87389 HIV-1 AG W/HIV-1&-2 AB AG IA: CPT | Performed by: FAMILY MEDICINE

## 2025-02-19 RX ORDER — NORGESTIMATE AND ETHINYL ESTRADIOL 0.25-0.035
1 KIT ORAL DAILY
Qty: 84 TABLET | Refills: 3 | Status: SHIPPED | OUTPATIENT
Start: 2025-02-19

## 2025-02-19 ASSESSMENT — PAIN SCALES - GENERAL: PAINLEVEL_OUTOF10: NO PAIN (0)

## 2025-02-19 NOTE — PATIENT INSTRUCTIONS
Good luck with school!    Let us know if any problems.    We will let you know your results as soon as we can.  If you have MyChart, you will be able to see your lab and imaging results shortly after they become available.  I will review these and let you know my interpretation, but this usually takes additional time.  If you have multiple labs, I usually wait until they are all back before sending a note about them unless something is worrisome.  If you do not have MyChart, we will let you know your lab results as soon as we can.  If they are normal, this can take up to a week.     Contact us or return if questions or concerns.    Have a nice day!    Dr. Belle

## 2025-02-19 NOTE — PROGRESS NOTES
Assessment & Plan       ICD-10-CM    1. Encounter for surveillance of contraceptive pills  Z30.41 norgestimate-ethinyl estradiol (ORTHO-CYCLEN) 0.25-35 MG-MCG tablet      2. Screening examination for infectious disease  Z11.9 Hepatitis B Surface Antibody     Hepatitis B Surface Antibody      3. Screening for HIV (human immunodeficiency virus)  Z11.4 HIV Antigen Antibody Combo     HIV Antigen Antibody Combo      4. Need for hepatitis C screening test  Z11.59 Hepatitis C Screen Reflex to HCV RNA Quant and Genotype     Hepatitis C Screen Reflex to HCV RNA Quant and Genotype      5. 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 Discussed benefits of one-time HIV and hepatitis C screening due to potential exposure risks during training.  - Order Quantiferon Gold test  - Order hepatitis B surface antibody titer  - Sign required form for PA school  - Order HIV and hepatitis C screening tests    Contraceptive Management  Requests refill of contraceptive medication due to upcoming move to [place]. Reports no issues with current birth control, including no irregular periods or concerning side effects. Prefers additional refills to avoid interruptions during move.  - Refill contraceptive medication prescription to [facility] with additional refills for use in [place]    General Health Maintenance  Up to date on immunizations, including meningitis vaccine and tetanus booster. Last COVID-19 booster in late October or early November 2024. Advised to get another COVID-19 booster before November for PA school. Declined additional immunizations today.  - Recommend COVID-19 booster before November 2025    Follow-up  - Send lab results via [employer].           Portions of this note were completed using  Ambient AI and/or Dragon dictation software.  Verbal patient consent received prior to use of AI software.  Although reviewed, there may be typographical and other inadvertent errors that remain.       Counseling  Appropriate preventive services were addressed with this patient via screening, questionnaire, or discussion as appropriate for fall prevention, nutrition, physical activity, Tobacco-use cessation, social engagement, weight loss and cognition.  Checklist reviewing preventive services available has been given to the patient.  Reviewed patient's diet, addressing concerns and/or questions.       Patient Instructions   Good luck with school!    Let us know if any problems.    We will let you know your results as soon as we can.  If you have MyChart, you will be able to see your lab and imaging results shortly after they become available.  I will review these and let you know my interpretation, but this usually takes additional time.  If you have multiple labs, I usually wait until they are all back before sending a note about them unless something is worrisome.  If you do not have MyChart, we will let you know your lab results as soon as we can.  If they are normal, this can take up to a week.     Contact us or return if questions or concerns.    Have a nice day!    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       History of Present Illness    Vanessa Bush is a 24 year old female who presents for a Quantiferon Gold test, hepatitis B titer, and contraceptive medication refill.    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 She will be relocating to [place] for her studies and needs a form signed for school purposes.    She requests a refill on her contraceptive medications. No issues with her current contraceptive medication, with regular periods and no concerning side effects.    She recently sustained a hand injury while playing ice hockey, resulting in a fracture. She underwent surgery approximately three weeks ago and is currently in the [facility] for her post-operative follow-up. A screw was placed in her hand to connect her metacarpal, and she is awaiting clearance from her surgeon. Her hand 'works great' and she is just waiting for the surgeon to clear her.    She has had two COVID-19 booster shots, the most recent being between late October and early November of the previous year, administered in [place]. She plans to get another booster by November for PA school requirements. She is up to date on most immunizations, including tetanus.    She is currently in her fifth year of [employer] eligibility and plans to travel across the [place] to visit national [place] before starting PA school. She will be living in her own apartment in [place] and not in dorms.    No current health issues, including pain, swelling, or rashes. No problems with her birth control medication, irregular periods, or concerning side effects.                         Objective    /73   Pulse 69   Temp 97.7  F (36.5  C) (Temporal)   Resp 19   Ht 1.743 m (5' 8.62\")   Wt 66.7 kg (147 lb)   SpO2 98%   BMI 21.95 kg/m    Body mass index is 21.95 kg/m .  Physical Exam   GENERAL: alert and no distress  EYES: Eyes grossly normal to inspection, PERRL and conjunctivae and sclerae normal  HENT: ear canals and TM's normal, nose and mouth without ulcers or lesions  NECK: no adenopathy, no asymmetry, masses, or scars  RESP: lungs clear to auscultation - no rales, rhonchi or wheezes  CV: regular rate and rhythm, " normal S1 S2, no S3 or S4, no murmur, click or rub, no peripheral edema  ABDOMEN: soft, nontender, no hepatosplenomegaly, no masses and bowel sounds normal  MS: no gross musculoskeletal defects noted, no edema  PSYCH: mentation appears normal, affect normal/bright    Office Visit on 06/06/2024   Component Date Value Ref Range Status    Interpretation 06/06/2024 Negative for Intraepithelial Lesion or Malignancy (NILM)    Final    Comment 06/06/2024    Final                    Value:                          Papanicolaou Test Limitations:  Cervical cytology is a screening test with                           limited sensitivity, and regular screening is critical for cancer                           prevention.  Pap tests are primarily effective for the                           diagnosis/prevention of squamous cell carcinoma, not adenocarcinoma or                           other cancers.                              Specimen Adequacy 06/06/2024 Satisfactory for evaluation, endocervical/transformation zone component absent   Final    Clinical Information 06/06/2024    Final                    Value:none    Reflex Testing 06/06/2024 No   Final    Previous Abnormal? 06/06/2024    Final                    Value:No    Performing Labs 06/06/2024    Final                    Value:The technical component of this testing was completed at [facility].                                                    Stain controls for all stains resulted within this report have been                           reviewed and show appropriate reactivity.    Chlamydia Trachomatis 06/06/2024 Negative  Negative Final    Negative for C. trachomatis rRNA by transcription mediated amplification.   A negative result by transcription mediated amplification does not preclude the presence of infection because results are dependent on proper and adequate collection, absence  of inhibitors and sufficient rRNA to be detected.    Neisseria gonorrhoeae 06/06/2024 Negative  Negative Final    Negative for N. gonorrhoeae rRNA by transcription mediated amplification. A negative result by transcription mediated amplification does not preclude the presence of C. trachomatis infection because results are dependent on proper and adequate collection, absence of inhibitors and sufficient rRNA to be detected.           Signed Electronically by: Franky Belle MD, MD

## 2025-02-20 LAB
QUANTIFERON MITOGEN: 10 IU/ML
QUANTIFERON NIL TUBE: 0.01 IU/ML
QUANTIFERON TB1 TUBE: 0.01 IU/ML
QUANTIFERON TB2 TUBE: 0.02

## 2025-02-20 NOTE — RESULT ENCOUNTER NOTE
Vanessa,    Your hepatitis to be antibody was intermediate.  This does not mean you definitely have immunity, but you still may.  We can repeat this in 1 month if you would like.  If you would like, you could also get a booster hepatitis B shot prior to that.    Still awaiting your tuberculosis testing.    Have a nice day!    Dr. Belle

## 2025-03-03 DIAGNOSIS — Z30.41 ENCOUNTER FOR SURVEILLANCE OF CONTRACEPTIVE PILLS: ICD-10-CM

## 2025-03-03 RX ORDER — NORGESTIMATE AND ETHINYL ESTRADIOL 0.25-0.035
1 KIT ORAL DAILY
Qty: 84 TABLET | Refills: 1 | Status: SHIPPED | OUTPATIENT
Start: 2025-03-03

## 2025-03-05 ENCOUNTER — ALLIED HEALTH/NURSE VISIT (OUTPATIENT)
Dept: FAMILY MEDICINE | Facility: OTHER | Age: 25
End: 2025-03-05
Payer: COMMERCIAL

## 2025-03-05 DIAGNOSIS — Z23 ENCOUNTER FOR IMMUNIZATION: Primary | ICD-10-CM

## 2025-03-05 PROCEDURE — 90746 HEPB VACCINE 3 DOSE ADULT IM: CPT

## 2025-03-05 PROCEDURE — 90471 IMMUNIZATION ADMIN: CPT

## 2025-03-05 PROCEDURE — 99207 PR NO CHARGE NURSE ONLY: CPT

## 2025-03-05 NOTE — PROGRESS NOTES
Prior to immunization administration, verified patients identity using patient s name and date of birth. Please see Immunization Activity for additional information.     Is the patient's temperature normal (100.5 or less)? Yes     Patient MEETS CRITERIA. PROCEED with vaccine administration.      Patient instructed to remain in clinic for 15 minutes afterwards, and to report any adverse reactions.      Link to Ancillary Visit Immunization Standing Orders SmartSet     Screening performed by Brooke Jolley CMA on 3/5/2025 at 3:30 PM.

## 2025-03-30 ENCOUNTER — LAB (OUTPATIENT)
Dept: LAB | Facility: CLINIC | Age: 25
End: 2025-03-30
Payer: COMMERCIAL

## 2025-03-30 DIAGNOSIS — Z01.84 IMMUNITY STATUS TESTING: ICD-10-CM

## 2025-03-30 LAB
HBV SURFACE AB SERPL IA-ACNC: 604 M[IU]/ML
HBV SURFACE AB SERPL IA-ACNC: REACTIVE M[IU]/ML

## 2025-03-30 PROCEDURE — 36415 COLL VENOUS BLD VENIPUNCTURE: CPT

## 2025-03-30 PROCEDURE — 86706 HEP B SURFACE ANTIBODY: CPT

## 2025-05-07 ENCOUNTER — PATIENT OUTREACH (OUTPATIENT)
Dept: CARE COORDINATION | Facility: CLINIC | Age: 25
End: 2025-05-07
Payer: COMMERCIAL

## 2025-05-21 ENCOUNTER — PATIENT OUTREACH (OUTPATIENT)
Dept: CARE COORDINATION | Facility: CLINIC | Age: 25
End: 2025-05-21
Payer: COMMERCIAL

## 2025-07-13 ENCOUNTER — HEALTH MAINTENANCE LETTER (OUTPATIENT)
Age: 25
End: 2025-07-13